# Patient Record
Sex: MALE | Race: WHITE
[De-identification: names, ages, dates, MRNs, and addresses within clinical notes are randomized per-mention and may not be internally consistent; named-entity substitution may affect disease eponyms.]

---

## 2018-08-23 ENCOUNTER — HOSPITAL ENCOUNTER (OUTPATIENT)
Dept: HOSPITAL 92 - SDC | Age: 28
Discharge: HOME | End: 2018-08-23
Attending: SURGERY
Payer: SELF-PAY

## 2018-08-23 DIAGNOSIS — K35.80: Primary | ICD-10-CM

## 2018-08-23 PROCEDURE — 88304 TISSUE EXAM BY PATHOLOGIST: CPT

## 2018-08-23 PROCEDURE — 0DTJ4ZZ RESECTION OF APPENDIX, PERCUTANEOUS ENDOSCOPIC APPROACH: ICD-10-PCS | Performed by: SURGERY

## 2018-08-23 NOTE — OP
DATE OF PROCEDURE:  08/23/2018

 

PREOPERATIVE DIAGNOSIS:  Acute appendicitis.

 

POSTOPERATIVE DIAGNOSIS:  Acute appendicitis.

 

PROCEDURE:  Laparoscopic appendectomy.

 

SURGEON:  Amarjit Simmons M.D

 

ANESTHESIA:  General.

 

ESTIMATED BLOOD LOSS:  Minimal.

 

COMPLICATIONS:  None.

 

SPECIMEN:  Appendix.

 

FINDINGS:  Appendicitis.

 

TECHNIQUE:  The patient was taken to the operating room and placed supine on the table. After general
 anesthetic was obtained, a Thompson catheter was placed.  The abdomen was shaved, prepped, and draped i
n a sterile fashion.  Curved incision was made below the umbilicus.  Cautery was used to dissect down
 to and score the fascia.  Abdominal cavity entered bluntly using a Alyssia clamp.  Holding stitch of P
DS placed on each side of the facet.  Brooks trocar was placed.  High-flow pneumoperitoneum was obtai
rene.  A suprapubic 5-mm port, left lower quadrant 5-mm port were placed under direct visualization.  
The cecum was rolled over to reveal acute appendicitis.  A small window was made at the base of the a
ppendix and mesoappendix.  Laparoscopic stapler fired across the base of the appendix.  A vascular re
load fired across the mesoappendix.  Appendix placed in the endocatch bag and brought out through the
 [place]. There was no bleeding.  There was no damage to any intraabdominal structures.  All ports reinaldo
t seems using local anesthetic.  All ports are removed under camera visualization.  Pneumoperitoneum 
is let down.  PDS used to close the fascial defect below the umbilicus.  All incisions were irrigated
 and closed using 4-0 Monocryl and Dermabond.  The patient en route to recovery in stable condition. 
 All instrument counts, needle counts, lap counts were correct.

## 2018-08-23 NOTE — HP
CHIEF COMPLAINT:  Right lower quadrant pain.

 

HISTORY OF PRESENT ILLNESS:  This is a 27-year-old male who has had diffuse abdominal discomfort asso
ciated with nausea and vomiting for the last few days, became more localized to his right lower quadr
ant today.  Pain is described as 9/10, sharp, does not radiate, associated with nausea and he did vom
it.  No change in stools.  No history of chronic inflammatory bowel disease or Crohn's.  Seen in the 
[facility] where he was found to have a CT-proven appendicitis.

 

PAST MEDICAL HISTORY:  Denies.

 

PAST SURGICAL HISTORY:  Denies.

 

MEDICINES TAKEN DAILY:  None.

 

ALLERGIES:  No known drug allergies.

 

SOCIAL HISTORY:  No smoking, alcohol, or other drugs.

 

REVIEW OF SYSTEMS:  Ten-system review of systems otherwise negative described above.

 

PHYSICAL EXAMINATION:

HEENT:  Sclerae are anicteric.  Oropharynx clear.

NECK:  No lymphadenopathy.

CHEST:  Clear.

HEART:  Regular rate and rhythm.

ABDOMEN:  Soft, tender in right lower quadrant with localized guarding.  No rebound, no abdominal her
nias.

EXTREMITIES:  No ischemia or edema to extremities.

 

IMAGING:  CT shows acute appendicitis.

 

ASSESSMENT:  Acute appendicitis.

 

PLAN:  Laparoscopic appendectomy.  Risks, benefits, alternatives discussed.  He gives consent.  We wi
ll do this today.